# Patient Record
(demographics unavailable — no encounter records)

---

## 2024-10-30 NOTE — ASSESSMENT
[FreeTextEntry1] : Impression: Acute gout left hallux.  He will be treated with Colcrys along with Indocin and will return if he does not improve the potential for a steroid injection would then be advised

## 2024-10-30 NOTE — PHYSICAL EXAM
[de-identified] : Exam today reveals an antalgic gait he has obvious swelling and tenderness to the MTP joint where there is an effusion restricted motion to the MTP joint is noted no erythema or findings to suggest infection.  The exam is consistent with a gouty attack

## 2024-10-30 NOTE — HISTORY OF PRESENT ILLNESS
[de-identified] : This 46-year-old seen today for evaluation of acute progressive pain in the left hallux mainly the MTP joint region.  He does have a history of gout had been doing well up until the past 3-4 days.  No obvious history of trauma he noted insidious onset of pain with swelling and the above area.  His general health has been good since last seen

## 2025-07-21 NOTE — PHYSICAL EXAM
Pt now using [facility], requesting metoprolol and ramipril prescriptions be sent to new mail order pharmacy. Refill requests sent to  for approval.  
[de-identified] : Examination today reveals an antalgic gait on the left side he has mild chronic swelling to the left ankle with mild restricted motion tenderness diffusely about the ankle no erythema no changes to suggest infection.  Remainder of foot exam is unremarkable

## 2025-07-21 NOTE — HISTORY OF PRESENT ILLNESS
[de-identified] : This 47-year-old is seen today for evaluation of his left ankle.  Over the past 2 weeks he has had pain and swelling stiffness causing limp.  This occurred insidiously and was progressive in nature.  He did take Aleve which was minimally helpful.  He does have a history of gout and has been treated for his ankle in the past.  Since last seen his general health has remained stable

## 2025-07-21 NOTE — ASSESSMENT
[FreeTextEntry1] :  impression: Acute gout left ankle.  He will be treated with Indocin and Colcrys/probenecid.  He will return if he is not improving